# Patient Record
Sex: MALE | Race: WHITE
[De-identification: names, ages, dates, MRNs, and addresses within clinical notes are randomized per-mention and may not be internally consistent; named-entity substitution may affect disease eponyms.]

---

## 2022-12-22 ENCOUNTER — HOSPITAL ENCOUNTER (OUTPATIENT)
Dept: HOSPITAL 95 - ER | Age: 82
Setting detail: OBSERVATION
LOS: 1 days | Discharge: HOME | End: 2022-12-23
Attending: INTERNAL MEDICINE | Admitting: STUDENT IN AN ORGANIZED HEALTH CARE EDUCATION/TRAINING PROGRAM
Payer: MEDICARE

## 2022-12-22 VITALS — HEIGHT: 67 IN | WEIGHT: 120 LBS | BODY MASS INDEX: 18.83 KG/M2

## 2022-12-22 DIAGNOSIS — Z20.822: ICD-10-CM

## 2022-12-22 DIAGNOSIS — E78.5: ICD-10-CM

## 2022-12-22 DIAGNOSIS — R00.1: ICD-10-CM

## 2022-12-22 DIAGNOSIS — D63.1: ICD-10-CM

## 2022-12-22 DIAGNOSIS — R07.9: ICD-10-CM

## 2022-12-22 DIAGNOSIS — Z95.1: ICD-10-CM

## 2022-12-22 DIAGNOSIS — R55: Primary | ICD-10-CM

## 2022-12-22 DIAGNOSIS — E83.42: ICD-10-CM

## 2022-12-22 DIAGNOSIS — N52.9: ICD-10-CM

## 2022-12-22 DIAGNOSIS — I50.33: ICD-10-CM

## 2022-12-22 DIAGNOSIS — D64.9: ICD-10-CM

## 2022-12-22 DIAGNOSIS — E11.9: ICD-10-CM

## 2022-12-22 DIAGNOSIS — N40.0: ICD-10-CM

## 2022-12-22 DIAGNOSIS — I11.0: ICD-10-CM

## 2022-12-22 DIAGNOSIS — I25.119: ICD-10-CM

## 2022-12-22 LAB
ALBUMIN SERPL BCP-MCNC: 3.4 G/DL (ref 3.4–5)
ALBUMIN/GLOB SERPL: 1.2 {RATIO} (ref 0.8–1.8)
ALT SERPL W P-5'-P-CCNC: 76 U/L (ref 12–78)
ANION GAP SERPL CALCULATED.4IONS-SCNC: 3 MMOL/L (ref 6–16)
AST SERPL W P-5'-P-CCNC: 49 U/L (ref 12–37)
BASOPHILS # BLD AUTO: 0.01 K/MM3 (ref 0–0.23)
BASOPHILS NFR BLD AUTO: 0 % (ref 0–2)
BILIRUB SERPL-MCNC: 0.6 MG/DL (ref 0.1–1)
BUN SERPL-MCNC: 20 MG/DL (ref 8–24)
CALCIUM SERPL-MCNC: 8.8 MG/DL (ref 8.5–10.1)
CHLORIDE SERPL-SCNC: 110 MMOL/L (ref 98–108)
CO2 SERPL-SCNC: 29 MMOL/L (ref 21–32)
CREAT SERPL-MCNC: 0.92 MG/DL (ref 0.6–1.2)
DEPRECATED RDW RBC AUTO: 47.4 FL (ref 35.1–46.3)
EOSINOPHIL # BLD AUTO: 0.08 K/MM3 (ref 0–0.68)
EOSINOPHIL NFR BLD AUTO: 2 % (ref 0–6)
ERYTHROCYTE [DISTWIDTH] IN BLOOD BY AUTOMATED COUNT: 13.4 % (ref 11.7–14.2)
FLUAV RNA SPEC QL NAA+PROBE: NEGATIVE
FLUBV RNA SPEC QL NAA+PROBE: NEGATIVE
GLOBULIN SER CALC-MCNC: 2.9 G/DL (ref 2.2–4)
GLUCOSE SERPL-MCNC: 119 MG/DL (ref 70–99)
GLUCOSE UR-MCNC: (no result) MG/DL
HCT VFR BLD AUTO: 35.4 % (ref 37–53)
HGB BLD-MCNC: 12.1 G/DL (ref 13.5–17.5)
IMM GRANULOCYTES # BLD AUTO: 0.01 K/MM3 (ref 0–0.1)
IMM GRANULOCYTES NFR BLD AUTO: 0 % (ref 0–1)
LEUKOCYTE ESTERASE UR QL STRIP: (no result)
LYMPHOCYTES # BLD AUTO: 1.54 K/MM3 (ref 0.84–5.2)
LYMPHOCYTES NFR BLD AUTO: 30 % (ref 21–46)
MCHC RBC AUTO-ENTMCNC: 34.2 G/DL (ref 31.5–36.5)
MCV RBC AUTO: 95 FL (ref 80–100)
MONOCYTES # BLD AUTO: 0.59 K/MM3 (ref 0.16–1.47)
MONOCYTES NFR BLD AUTO: 11 % (ref 4–13)
NEUTROPHILS # BLD AUTO: 2.96 K/MM3 (ref 1.96–9.15)
NEUTROPHILS NFR BLD AUTO: 57 % (ref 41–73)
NRBC # BLD AUTO: 0 K/MM3 (ref 0–0.02)
NRBC BLD AUTO-RTO: 0 /100 WBC (ref 0–0.2)
PLATELET # BLD AUTO: 144 K/MM3 (ref 150–400)
POTASSIUM SERPL-SCNC: 4.2 MMOL/L (ref 3.5–5.5)
PROT SERPL-MCNC: 6.3 G/DL (ref 6.4–8.2)
RBC #/AREA URNS HPF: (no result) /HPF (ref 0–2)
RSV RNA SPEC QL NAA+PROBE: NEGATIVE
SARS-COV-2 RNA RESP QL NAA+PROBE: NEGATIVE
SODIUM SERPL-SCNC: 142 MMOL/L (ref 136–145)
SP GR SPEC: 1.01 (ref 1–1.02)
UROBILINOGEN UR STRIP-MCNC: (no result) MG/DL
WBC #/AREA URNS HPF: (no result) /HPF (ref 0–5)

## 2022-12-22 PROCEDURE — A9270 NON-COVERED ITEM OR SERVICE: HCPCS

## 2022-12-22 PROCEDURE — G0378 HOSPITAL OBSERVATION PER HR: HCPCS

## 2022-12-22 NOTE — NUR
DR PEREZ IN TO SEE PT
PLAN TO DC METOPROLOL, ZIO PATCH IN PLACE PRIOR TO DC-ORDER ENTERED, PLAN TO
FOLLOW UP W/ OUT PT CARDIOLOGY TO ASSESS BRADYCARDIA BURDEN. TELE IN PLACE @
THIS TIME, DR. LARA NOTIFIED OF PLAN.

## 2022-12-22 NOTE — NUR
SHIFT SUMMARY
PT A&OX4 AND IN PLEASENT MOOD T/O SHIFT. WIFE @ BEDSIDE. ANA TO SEE PT-SEE
NOTE. AWAITING ZIO PATCH. HTN, BRADYCARDIA NOTED-TELE IN PLACE. METOPROLOL
LAST TAKEN APPROX. 2100 12/21/22. PT DENIES CP/PRESSURE OR DIZZINESS AT REST.
TOLERATING PO INTAKE WELL @ THIS TIME. CALL LIGHT W/IN REACH.

## 2022-12-22 NOTE — NUR
DR. LARA CONTACTED
VERIFIED DNI STATUS W/ LIMITED CPR. PLAN FOR NPO @ MIDNIGHT-WAITING FOR
CARDIOLOGY @ THIS TIME. REPORTED HR OF 38 PER TELE MONITOR

## 2022-12-23 LAB
ALBUMIN SERPL BCP-MCNC: 2.8 G/DL (ref 3.4–5)
ALBUMIN/GLOB SERPL: 1.2 {RATIO} (ref 0.8–1.8)
ALT SERPL W P-5'-P-CCNC: 54 U/L (ref 12–78)
ANION GAP SERPL CALCULATED.4IONS-SCNC: 4 MMOL/L (ref 6–16)
AST SERPL W P-5'-P-CCNC: 25 U/L (ref 12–37)
BASOPHILS # BLD AUTO: 0.02 K/MM3 (ref 0–0.23)
BASOPHILS NFR BLD AUTO: 0 % (ref 0–2)
BILIRUB SERPL-MCNC: 0.7 MG/DL (ref 0.1–1)
BUN SERPL-MCNC: 17 MG/DL (ref 8–24)
CALCIUM SERPL-MCNC: 7.9 MG/DL (ref 8.5–10.1)
CHLORIDE SERPL-SCNC: 109 MMOL/L (ref 98–108)
CO2 SERPL-SCNC: 29 MMOL/L (ref 21–32)
CREAT SERPL-MCNC: 0.94 MG/DL (ref 0.6–1.2)
DEPRECATED RDW RBC AUTO: 47.5 FL (ref 35.1–46.3)
EOSINOPHIL # BLD AUTO: 0.12 K/MM3 (ref 0–0.68)
EOSINOPHIL NFR BLD AUTO: 2 % (ref 0–6)
ERYTHROCYTE [DISTWIDTH] IN BLOOD BY AUTOMATED COUNT: 13.4 % (ref 11.7–14.2)
GLOBULIN SER CALC-MCNC: 2.4 G/DL (ref 2.2–4)
GLUCOSE SERPL-MCNC: 123 MG/DL (ref 70–99)
HCT VFR BLD AUTO: 33.3 % (ref 37–53)
HGB BLD-MCNC: 11.2 G/DL (ref 13.5–17.5)
IMM GRANULOCYTES # BLD AUTO: 0.01 K/MM3 (ref 0–0.1)
IMM GRANULOCYTES NFR BLD AUTO: 0 % (ref 0–1)
LYMPHOCYTES # BLD AUTO: 1.5 K/MM3 (ref 0.84–5.2)
LYMPHOCYTES NFR BLD AUTO: 28 % (ref 21–46)
MAGNESIUM SERPL-MCNC: 1.9 MG/DL (ref 1.6–2.4)
MCHC RBC AUTO-ENTMCNC: 33.6 G/DL (ref 31.5–36.5)
MCV RBC AUTO: 96 FL (ref 80–100)
MONOCYTES # BLD AUTO: 0.48 K/MM3 (ref 0.16–1.47)
MONOCYTES NFR BLD AUTO: 9 % (ref 4–13)
NEUTROPHILS # BLD AUTO: 3.17 K/MM3 (ref 1.96–9.15)
NEUTROPHILS NFR BLD AUTO: 60 % (ref 41–73)
NRBC # BLD AUTO: 0 K/MM3 (ref 0–0.02)
NRBC BLD AUTO-RTO: 0 /100 WBC (ref 0–0.2)
PLATELET # BLD AUTO: 143 K/MM3 (ref 150–400)
POTASSIUM SERPL-SCNC: 4.2 MMOL/L (ref 3.5–5.5)
PROT SERPL-MCNC: 5.2 G/DL (ref 6.4–8.2)
SODIUM SERPL-SCNC: 142 MMOL/L (ref 136–145)

## 2022-12-23 NOTE — NUR
PT DISCHARGED
 THE PT AND HIS WIFE VERBALIZED UNDERSTANDING OF THE DC INSTRUCTIONS. THE PTS
WIFE REQUESTED THAT DR. PEREZ BE CALLED TO VERIFY A MEDICATION THAT SHE
UNDERSTOOD NEEDED TO BE ORDERED. A CALL WAS MADE TO DR. DEE WHO VERIFIED
THAT THE PT DID NOT NEED TO HAVE AMLODIPINE ORDERED AND THAT THE ORDERED
LISINOPRIL WOULD BE ENOUGH FOR THE PT AT THIS TIME. A ZIO PATCH WAS PLACED ON
THE PT PRIOR TO DC, AND THE PT WAS GIVEN INSTUCTIONS ON RETURNING THAT FROM
THE [facility] RN. PT DECLINED A WHEELCHAIR TRANSPORT AND AMBULATED OUT WITH
HIS WIFE STEADY ON HIS FEET

## 2023-07-20 ENCOUNTER — HOSPITAL ENCOUNTER (INPATIENT)
Dept: HOSPITAL 95 - MHTC | Age: 83
LOS: 1 days | Discharge: HOME | DRG: 244 | End: 2023-07-21
Attending: STUDENT IN AN ORGANIZED HEALTH CARE EDUCATION/TRAINING PROGRAM | Admitting: STUDENT IN AN ORGANIZED HEALTH CARE EDUCATION/TRAINING PROGRAM
Payer: COMMERCIAL

## 2023-07-20 VITALS — WEIGHT: 129.85 LBS | BODY MASS INDEX: 19.68 KG/M2 | HEIGHT: 68 IN

## 2023-07-20 VITALS — SYSTOLIC BLOOD PRESSURE: 160 MMHG | DIASTOLIC BLOOD PRESSURE: 67 MMHG

## 2023-07-20 VITALS — SYSTOLIC BLOOD PRESSURE: 123 MMHG | DIASTOLIC BLOOD PRESSURE: 58 MMHG

## 2023-07-20 VITALS — DIASTOLIC BLOOD PRESSURE: 80 MMHG | SYSTOLIC BLOOD PRESSURE: 181 MMHG

## 2023-07-20 VITALS — SYSTOLIC BLOOD PRESSURE: 171 MMHG | DIASTOLIC BLOOD PRESSURE: 60 MMHG

## 2023-07-20 VITALS — SYSTOLIC BLOOD PRESSURE: 188 MMHG | DIASTOLIC BLOOD PRESSURE: 73 MMHG

## 2023-07-20 VITALS — SYSTOLIC BLOOD PRESSURE: 143 MMHG | DIASTOLIC BLOOD PRESSURE: 63 MMHG

## 2023-07-20 DIAGNOSIS — N40.0: ICD-10-CM

## 2023-07-20 DIAGNOSIS — Z79.811: ICD-10-CM

## 2023-07-20 DIAGNOSIS — Z98.42: ICD-10-CM

## 2023-07-20 DIAGNOSIS — E78.5: ICD-10-CM

## 2023-07-20 DIAGNOSIS — Z98.890: ICD-10-CM

## 2023-07-20 DIAGNOSIS — E11.9: ICD-10-CM

## 2023-07-20 DIAGNOSIS — Z79.82: ICD-10-CM

## 2023-07-20 DIAGNOSIS — Z79.899: ICD-10-CM

## 2023-07-20 DIAGNOSIS — Z86.79: ICD-10-CM

## 2023-07-20 DIAGNOSIS — Z95.1: ICD-10-CM

## 2023-07-20 DIAGNOSIS — Z98.1: ICD-10-CM

## 2023-07-20 DIAGNOSIS — I10: ICD-10-CM

## 2023-07-20 DIAGNOSIS — Z91.048: ICD-10-CM

## 2023-07-20 DIAGNOSIS — I49.5: ICD-10-CM

## 2023-07-20 DIAGNOSIS — Z79.84: ICD-10-CM

## 2023-07-20 DIAGNOSIS — I49.3: ICD-10-CM

## 2023-07-20 DIAGNOSIS — Z91.018: ICD-10-CM

## 2023-07-20 DIAGNOSIS — Z98.41: ICD-10-CM

## 2023-07-20 DIAGNOSIS — Z79.02: ICD-10-CM

## 2023-07-20 DIAGNOSIS — I25.10: ICD-10-CM

## 2023-07-20 DIAGNOSIS — I44.2: Primary | ICD-10-CM

## 2023-07-20 DIAGNOSIS — I25.2: ICD-10-CM

## 2023-07-20 PROCEDURE — A9270 NON-COVERED ITEM OR SERVICE: HCPCS

## 2023-07-20 PROCEDURE — C1898 LEAD, PMKR, OTHER THAN TRANS: HCPCS

## 2023-07-20 PROCEDURE — 02H63JZ INSERTION OF PACEMAKER LEAD INTO RIGHT ATRIUM, PERCUTANEOUS APPROACH: ICD-10-PCS | Performed by: INTERNAL MEDICINE

## 2023-07-20 PROCEDURE — C1785 PMKR, DUAL, RATE-RESP: HCPCS

## 2023-07-20 PROCEDURE — C1769 GUIDE WIRE: HCPCS

## 2023-07-20 PROCEDURE — 02HK3JZ INSERTION OF PACEMAKER LEAD INTO RIGHT VENTRICLE, PERCUTANEOUS APPROACH: ICD-10-PCS | Performed by: INTERNAL MEDICINE

## 2023-07-20 PROCEDURE — 0JH606Z INSERTION OF PACEMAKER, DUAL CHAMBER INTO CHEST SUBCUTANEOUS TISSUE AND FASCIA, OPEN APPROACH: ICD-10-PCS | Performed by: INTERNAL MEDICINE

## 2023-07-20 NOTE — NUR
PT ADMITTED TO [facility] POST PACEMAKER.
 
PT CAME IN TODAY FOR AN ELECTIVE SURGERY FOR A PACEMAKER, HE IS A TRANSFER
FROM [facility].
 
PT IS A&OX4, CALLS APPROPARITELY, AND HAS WIFE AT THE BEDSIDE. THE PT IS C/O
4-5/10 PAIN IN THE LEFT CHEST WALL. HE WAS MEDICATED W/ 650MG OF TYLENOL PER
MD ORDERS. PT IS HYPERTENSIVE AND DR. PEREZ SAID IT IS LIKELY DUE TO PAIN. HE
WANTED ME TO MEDICATED THE PT FOR PAIN AND IF THE BP DOES NOT RESOVLE IN A FEW
HOURS, THEN WE CAN GIVE 10MG HYDRALIZINE IV PRN (FOR SBP >160). PT HAS
SUTURES, STERI-STRIPS, GLUE, PRESSURE DRESSING, GAUZE, AND TAPE OF THE
INSERTION SITE. NO SIGNS OF DRAINAGE. A 1V CHEST XRAY WAS OBTAINED WHEN THE PT
ARRIVED TO THE ROOM. THERE IS AN ORDER FOR A 2V CHEST XRAY IN THE MORNING. HE
IS AV PACED AT 60 ON TELE, AND DENIES SOB W/ SP02 >90% ON ROOM AIR. BED ALARM
ON, BED IN LOW, CALL LIGHT IN REACH.
 
PT AND FAMILY ASSESSED/EDUCATED ON FIRE IGNITION RISK AND SAFETY.

## 2023-07-21 VITALS — SYSTOLIC BLOOD PRESSURE: 166 MMHG | DIASTOLIC BLOOD PRESSURE: 72 MMHG

## 2023-07-21 VITALS — SYSTOLIC BLOOD PRESSURE: 144 MMHG | DIASTOLIC BLOOD PRESSURE: 69 MMHG

## 2023-07-21 NOTE — NUR
SHIFT SUMMARY
PATIENT ALERT AND ORINETED X4. MEDICATED TWICE PER EMAR FOR PAIN AT THE PACER
SITE. PATIENT ON ROOM AIR WITH NO COMPLAINTS OF SHORNTESS OF BREATH. PACEMAKER
INTERROGATED THIS MORNING. NO ACUTE ISSUES NOTED OVERNIGHT. PATIENT EDUCATED
ON FIRE SAFETY AND IGNITION RISK IN THE HOSPITAL. WILL CONTINUE TO MONITOR.
CALL LIGHT WITHIN REACH.

## 2023-07-21 NOTE — NUR
IV REMOVED AND PRESSURE DRESSED. PT EDUCATED ON D/C TEACHING, HE AND WIFE
EXPRESSED UNDERSTANDING OF DC TEACHING AND DENY FURHER NEEDS